# Patient Record
Sex: MALE | Race: WHITE | NOT HISPANIC OR LATINO
[De-identification: names, ages, dates, MRNs, and addresses within clinical notes are randomized per-mention and may not be internally consistent; named-entity substitution may affect disease eponyms.]

---

## 2024-08-02 PROBLEM — Z00.00 ENCOUNTER FOR PREVENTIVE HEALTH EXAMINATION: Status: ACTIVE | Noted: 2024-08-02

## 2024-08-05 ENCOUNTER — NON-APPOINTMENT (OUTPATIENT)
Age: 59
End: 2024-08-05

## 2024-08-05 PROBLEM — K21.9 GERD (GASTROESOPHAGEAL REFLUX DISEASE): Status: ACTIVE | Noted: 2024-08-05

## 2024-08-06 ENCOUNTER — NON-APPOINTMENT (OUTPATIENT)
Age: 59
End: 2024-08-06

## 2024-08-06 ENCOUNTER — APPOINTMENT (OUTPATIENT)
Dept: NEUROLOGY | Facility: CLINIC | Age: 59
End: 2024-08-06

## 2024-08-06 PROBLEM — Z87.891 FORMER SMOKER: Status: ACTIVE | Noted: 2024-08-06

## 2024-08-06 PROBLEM — Z78.9 DOES NOT USE ILLICIT DRUGS: Status: ACTIVE | Noted: 2024-08-06

## 2024-08-06 PROBLEM — Z78.9 CURRENT NON-DRINKER OF ALCOHOL: Status: ACTIVE | Noted: 2024-08-06

## 2024-08-06 PROCEDURE — 99205 OFFICE O/P NEW HI 60 MIN: CPT

## 2024-08-06 NOTE — DATA REVIEWED
[de-identified] : Exam: CT HEAD-NON STROKE 7/31/24 Order#: [id number]    CLINICAL INFORMATION: headaches  COMPARISON: None.  Multiple axial sections were performed from the base skull to vertex without contrast enhancement. Coronal and sagittal reconstructions were performed  The lateral ventricles have a normal configuration.  Prominent extra axial fluid seen involving the posterior fossa and region. This seen posteriorly and just to the right of midline. This could be compatible with a prominent cisterna magna versus arachnoid cyst. This finding measures approximately 2.7 x 3.8 cm  No acute hemorrhage is seen  Evaluation of the osseous structures with the appropriate window appears normal  The visualized paranasal sinuses mastoid and middle ear regions appear clear.   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

## 2024-08-06 NOTE — HISTORY OF PRESENT ILLNESS
[de-identified] : FOZIA BENNETT is a 58 year male with a PMH of  GERD and cluster headaches who presents to the office today with +++ in attendance for neurosurgical consultation for a cyst found on imaging.  He had presented to [facility] on 7/31/24 with 2 weeks of persistent headaches which began while he was on vacation in [facility] He had taken over the counter medications with minimal relief and when he returned his headaches had increased in severity for 2 days prior to presentation.  Imaging reviewed and detailed below.   Surg Hx: Meds: Allergies: sulfamethoxazole, trimethroprim and penicillins Soc Hx: ***smoker, ***ETOH, lives with ***, works as ***

## 2024-08-06 NOTE — DATA REVIEWED
[de-identified] : Exam: CT HEAD-NON STROKE 7/31/24 Order#: [id number]    CLINICAL INFORMATION: headaches  COMPARISON: None.  Multiple axial sections were performed from the base skull to vertex without contrast enhancement. Coronal and sagittal reconstructions were performed  The lateral ventricles have a normal configuration.  Prominent extra axial fluid seen involving the posterior fossa and region. This seen posteriorly and just to the right of midline. This could be compatible with a prominent cisterna magna versus arachnoid cyst. This finding measures approximately 2.7 x 3.8 cm  No acute hemorrhage is seen  Evaluation of the osseous structures with the appropriate window appears normal  The visualized paranasal sinuses mastoid and middle ear regions appear clear.   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

## 2024-08-06 NOTE — DATA REVIEWED
[de-identified] : Exam: CT HEAD-NON STROKE 7/31/24 Order#: [id number]    CLINICAL INFORMATION: headaches  COMPARISON: None.  Multiple axial sections were performed from the base skull to vertex without contrast enhancement. Coronal and sagittal reconstructions were performed  The lateral ventricles have a normal configuration.  Prominent extra axial fluid seen involving the posterior fossa and region. This seen posteriorly and just to the right of midline. This could be compatible with a prominent cisterna magna versus arachnoid cyst. This finding measures approximately 2.7 x 3.8 cm  No acute hemorrhage is seen  Evaluation of the osseous structures with the appropriate window appears normal  The visualized paranasal sinuses mastoid and middle ear regions appear clear.   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

## 2024-08-06 NOTE — HISTORY OF PRESENT ILLNESS
[de-identified] : FOZIA BENNETT is a 58 year male with a PMH of  GERD and cluster headaches who presents to the office today with +++ in attendance for neurosurgical consultation for a cyst found on imaging.  He had presented to [place] [facility] on 7/31/24 with 2 weeks of persistent headaches which began while he was on vacation in [facility]. He had taken over the counter medications with minimal relief and when he returned his headaches had increased in severity for 2 days prior to presentation.  Imaging reviewed and detailed below.   Surg Hx: Meds: Allergies: sulfamethoxazole, trimethroprim and penicillins Soc Hx: ***smoker, ***ETOH, lives with ***, works as ***

## 2024-08-07 PROBLEM — G44.009 CLUSTER HEADACHES: Status: ACTIVE | Noted: 2024-08-05

## 2024-08-07 PROBLEM — G93.0 ARACHNOID CYST: Status: ACTIVE | Noted: 2024-08-06

## 2024-08-07 NOTE — HISTORY OF PRESENT ILLNESS
[FreeTextEntry1] : This is a LH 57 y/o man who is being seen in neurologic consultation for headaches.  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  He was in [place] on vacation 2 weeks ago and started noticing the onset of a headache there. starts in back of head into the back of eyes (back to front)-  eyes "slimy tears" no congestion once ha starts, it is severe and intense. Currently at last 20 to 30 minutes with medications.  He tried the following medications and noted no improvement. Excedrin migraine advil aleve tylenol  He notes improvement in his symptoms with the following medications: Caffeine pill red bull and coffee Chewing gum   Hair comes at night  usually 6 o7 pm last 20 minutes to 30 minutes takes caffeine and then he is okay   This was happening every day-it did not occur the last 2 days. Took Naproxen and Tylenol -- as preventative 2 days ago. He took just naproxen last night and did not have a headache. He also has been taking Flonase.  He was seen in the emergency department last week.  CT of the head was essentially normal.  There was a concern over an arachnoid cyst.  Patient states he had imaging of his brain 2 years ago with Dr. Singh at [facility].  He does not recall being told that he had a cyst at that time.

## 2024-08-07 NOTE — HISTORY OF PRESENT ILLNESS
[FreeTextEntry1] : This is a LH 59 y/o man who is being seen in neurologic consultation for headaches.  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  He was in [place] on vacation 2 weeks ago and started noticing the onset of a headache there. starts in back of head into the back of eyes (back to front)-  eyes "slimy tears" no congestion once ha starts, it is severe and intense. Currently at last 20 to 30 minutes with medications.  He tried the following medications and noted no improvement. Excedrin migraine advil aleve tylenol  He notes improvement in his symptoms with the following medications: Caffeine pill red bull and coffee Chewing gum   Hair comes at night  usually 6 o7 pm last 20 minutes to 30 minutes takes caffeine and then he is okay   This was happening every day-it did not occur the last 2 days. Took Naproxen and Tylenol -- as preventative 2 days ago. He took just naproxen last night and did not have a headache. He also has been taking Flonase.  He was seen in the emergency department last week.  CT of the head was essentially normal.  There was a concern over an arachnoid cyst.  Patient states he had imaging of his brain 2 years ago with Dr. Singh at [place].  He does not recall being told that he had a cyst at that time.

## 2024-08-07 NOTE — PHYSICAL EXAM
[FreeTextEntry1] : Physical examination  General: No acute distress, Awake, Alert.   Mental status  Awake, alert, and oriented to person, time and place, Normal attention span and concentration, Recent and remote memory intact, Language intact, Fund of knowledge intact.  Cranial Nerves  II: VFF  III, IV, VI: PERRL, EOMI.  V: Facial sensation is normal B/L.  VII: Facial strength is normal B/L.  VIII: Gross hearing is intact.  IX, X: Palate is midline and elevates symmetrically.  XI: Trapezius normal strength.  XII: Tongue midline without atrophy or fasciculations.   Motor exam  Muscle tone - no evidence of rigidity or resistance in all 4 extremities.  No atrophy or fasciculations  Muscle Strength: arms and legs, proximal and distal flexors and extensors are normal  No UE drift.  Reflexes  All present, normal, and symmetrical.  Plantars right: mute.  Plantars left: mute.   Coordination  Finger to nose: Normal.  Heel to shin: Normal.    Gait  Normal

## 2024-08-07 NOTE — ASSESSMENT
[FreeTextEntry1] : Semination is normal.  His headaches do not fit a typical cluster presentation.  Typically cluster headaches tend to be unilateral.  He thinks that his headaches are bilateral.  He recalls being given oxygen 2 years ago.  He does not recall improvement with oxygen.  He was still labeled as a cluster headache at that time.  He has been headache free since then.  The headaches are not typical for a migraine presentation.  They can certainly represent a trigeminal cephalgia.   I advised him to monitor the headache frequency at this time.  If it persists, we could try prevention with verapamil or another agent.  At present he has been headache free for 2 nights.  I will give him sumatriptan to take at onset and see if it actually helps his headaches.  I will obtain records from [place].  Will repeat MRI of the head with and without gadolinium.  He will keep me posted on his clinical progress and we will decide on follow-up after this is complete.

## 2024-08-07 NOTE — ASSESSMENT
[FreeTextEntry1] : Semination is normal.  His headaches do not fit a typical cluster presentation.  Typically cluster headaches tend to be unilateral.  He thinks that his headaches are bilateral.  He recalls being given oxygen 2 years ago.  He does not recall improvement with oxygen.  He was still labeled as a cluster headache at that time.  He has been headache free since then.  The headaches are not typical for a migraine presentation.  They can certainly represent a trigeminal cephalgia.   I advised him to monitor the headache frequency at this time.  If it persists, we could try prevention with verapamil or another agent.  At present he has been headache free for 2 nights.  I will give him sumatriptan to take at onset and see if it actually helps his headaches.  I will obtain records from [place].  Will repeat MRI of the head with and without gadolinium.  He will keep me posted on his clinical progress and we will decide on follow-up after this is complete.

## 2024-08-07 NOTE — CONSULT LETTER
[Dear  ___] : Dear  [unfilled], [Consult Letter:] : I had the pleasure of evaluating your patient, [unfilled]. [Please see my note below.] : Please see my note below. [FreeTextEntry3] : Sincerely,  Landry Mckeon M.D.

## 2024-08-15 ENCOUNTER — APPOINTMENT (OUTPATIENT)
Dept: NEUROSURGERY | Facility: CLINIC | Age: 59
End: 2024-08-15
Payer: COMMERCIAL

## 2024-08-15 PROCEDURE — 99205 OFFICE O/P NEW HI 60 MIN: CPT

## 2024-08-15 NOTE — PHYSICAL EXAM
[Oriented To Time, Place, And Person] : oriented to person, place, and time [Cranial Nerves Optic (II)] : visual acuity intact bilaterally,  pupils equal round and reactive to light [Cranial Nerves Oculomotor (III)] : extraocular motion intact [Cranial Nerves Trigeminal (V)] : facial sensation intact symmetrically [Cranial Nerves Facial (VII)] : face symmetrical [Cranial Nerves Vestibulocochlear (VIII)] : hearing was intact bilaterally [Cranial Nerves Glossopharyngeal (IX)] : tongue and palate midline [Cranial Nerves Accessory (XI - Cranial And Spinal)] : head turning and shoulder shrug symmetric [Cranial Nerves Hypoglossal (XII)] : there was no tongue deviation with protrusion [Motor Tone] : muscle tone was normal in all four extremities [Motor Strength] : muscle strength was normal in all four extremities

## 2024-08-22 ENCOUNTER — NON-APPOINTMENT (OUTPATIENT)
Age: 59
End: 2024-08-22